# Patient Record
Sex: MALE | Race: OTHER
[De-identification: names, ages, dates, MRNs, and addresses within clinical notes are randomized per-mention and may not be internally consistent; named-entity substitution may affect disease eponyms.]

---

## 2017-09-11 ENCOUNTER — HOSPITAL ENCOUNTER (EMERGENCY)
Dept: HOSPITAL 72 - EMR | Age: 42
Discharge: HOME | End: 2017-09-11
Payer: COMMERCIAL

## 2017-09-11 VITALS — SYSTOLIC BLOOD PRESSURE: 148 MMHG | DIASTOLIC BLOOD PRESSURE: 74 MMHG

## 2017-09-11 VITALS — BODY MASS INDEX: 31.84 KG/M2 | HEIGHT: 69 IN | WEIGHT: 215 LBS

## 2017-09-11 DIAGNOSIS — M54.9: Primary | ICD-10-CM

## 2017-09-11 PROCEDURE — 99284 EMERGENCY DEPT VISIT MOD MDM: CPT

## 2017-09-11 NOTE — EMERGENCY ROOM REPORT
History of Present Illness


General


Chief Complaint:  Back Pain-No Injury


Source:  Patient





Present Illness


HPI


41 yo M with pmhx of hypertension p/w back pain for 14 days. Patient states 

pain started about 3 years ago, and he gets this similar pain about once or 

twice a year.. Pain is localized to R lower back, sharp in nature, radiating 

down leg. Movement worsens pain especially getting up after sitting position, 

but patient states that that he is still been able to ambulate. There are no 

alleviating factors.  Patient has not taken any pain medications.


Denies trauma. Denies lower extremity weakness/numbness, no bowel/bladder 

retention or incontinence, saddle anesthesia. Denies fever, chills, abdominal 

pain, n/v, dysuria/hematuria. No history of IVDA


Allergies:  


Coded Allergies:  


     No Known Allergies (Unverified , 9/11/17)





Patient History


Past Medical History:  see triage record


Past Surgical History:  none


Pertinent Family History:  none


Reviewed Nursing Documentation:  PMH: Agreed, PSxH: Agreed





Nursing Documentation-PMH


Past Medical History:  No Stated History





Review of Systems


All Other Systems:  negative except mentioned in HPI





Physical Exam





Vital Signs








  Date Time  Temp Pulse Resp B/P (MAP) Pulse Ox O2 Delivery O2 Flow Rate FiO2


 


9/11/17 09:25 98.1 67 16 176/102 97 Room Air  








Sp02 EP Interpretation:  reviewed, normal


General Appearance:  normal inspection, well appearing, no apparent distress, 

alert, GCS 15, non-toxic


Head:  normocephalic, atraumatic


Eyes:  bilateral eye normal inspection, bilateral eye PERRL, bilateral eye EOMI


ENT:  normal ENT inspection, normal pharynx, normal voice, moist mucus membranes


Neck:  normal inspection, full range of motion, supple


Respiratory:  normal inspection, lungs clear, normal breath sounds, no 

respiratory distress, no retraction, no wheezing, speaking full sentences, 

chest symmetrical


Cardiovascular #1:  normal inspection, regular rate, rhythm, no edema, normal 

capillary refill


Cardiovascular #2:  2+ radial (R), 2+ radial (L)


Gastrointestinal:  normal inspection, non tender, soft, non-distended, no 

guarding


Genitourinary:  no CVA tenderness


Musculoskeletal:  normal range of motion, other - Right-sided lower lumbar 

paraspinal tenderness, no midline tenderness


Neurologic:  normal inspection, alert, oriented x3, responsive, motor strength/

tone normal, sensory intact, normal gait, speech normal, other - Motor strength 

is 5 out of 5 all 4 extremities


Psychiatric:  normal inspection, judgement/insight normal, memory normal


Skin:  normal inspection, normal color, no rash, warm/dry, well hydrated, 

normal turgor





Medical Decision Making


Diagnostic Impression:  


 Primary Impression:  


 Back pain


ER Course


41 yo male with pmhx of htn p/w back pain


DDX: likely musculoskeletal back pain vs. muscular strain vs. sciatica


Lumbar fracture is unlikely given patients age, no midline tenderness, no 

history of trauma, and that patient is ambulatory. Therefore, at this time no 

imaging is indicated 


Serious diagnoses such as cord compression, epidural abscess is unlikely in 

this patient given the clinical scenario and abscess of neurological symptoms 

or findings. Patient appears nontoxic. 


Plan: motrin, robaxin (lower dose)





ER course:


Patient has remained nontoxic appearing and ambulatory in the ED. 


Pain improved w/ medications





Disposition:


Patient will be discharged to home with prescription of motrin and robaxin.


Patient is to follow up with their PMD within 5 days. Patient agrees with plan. 





Please note that this Emergency Department Report was dictated using [employer] technology software, occasionally this can lead to 

erroneous entry secondary to interpretation by the dictation equipment.





Last Vital Signs








  Date Time  Temp Pulse Resp B/P (MAP) Pulse Ox O2 Delivery O2 Flow Rate FiO2


 


9/11/17 09:25 98.1 67 16 176/102 97 Room Air  








Disposition:  HOME, SELF-CARE


Condition:  Improved


Scripts


Methocarbamol* (ROBAXIN*) 500 Mg Tablet


500 MG PO QID, #28 TAB 0 Refills


   Prov: Steve Santiago M.D.         9/11/17 


Ibuprofen* (MOTRIN*) 600 Mg Tablet


600 MG ORAL Q8H Y for For Pain, #30 TAB 0 Refills


   Prov: Steve Santiago M.D.         9/11/17


Patient Instructions:  Back Pain, Adult











Steve Santiago M.D. Sep 11, 2017 09:50

## 2019-11-30 ENCOUNTER — HOSPITAL ENCOUNTER (EMERGENCY)
Dept: HOSPITAL 72 - EMR | Age: 44
Discharge: HOME | End: 2019-11-30
Payer: COMMERCIAL

## 2019-11-30 VITALS — SYSTOLIC BLOOD PRESSURE: 140 MMHG | DIASTOLIC BLOOD PRESSURE: 78 MMHG

## 2019-11-30 VITALS — BODY MASS INDEX: 37.08 KG/M2 | WEIGHT: 259 LBS | HEIGHT: 70 IN

## 2019-11-30 VITALS — SYSTOLIC BLOOD PRESSURE: 147 MMHG | DIASTOLIC BLOOD PRESSURE: 85 MMHG

## 2019-11-30 DIAGNOSIS — I10: ICD-10-CM

## 2019-11-30 DIAGNOSIS — S61.412A: Primary | ICD-10-CM
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
## 2019-11-30 DIAGNOSIS — Z23: ICD-10-CM

## 2019-11-30 PROCEDURE — 90715 TDAP VACCINE 7 YRS/> IM: CPT

## 2019-11-30 PROCEDURE — 99283 EMERGENCY DEPT VISIT LOW MDM: CPT

## 2019-11-30 PROCEDURE — 90471 IMMUNIZATION ADMIN: CPT

## 2019-11-30 PROCEDURE — 73130 X-RAY EXAM OF HAND: CPT

## 2019-11-30 PROCEDURE — 12001 RPR S/N/AX/GEN/TRNK 2.5CM/<: CPT

## 2019-11-30 NOTE — EMERGENCY ROOM REPORT
History of Present Illness


General


Chief Complaint:  Laceration


Source:  Patient





Present Illness


HPI


44-year-old male with no significant past medical history here complaining of a 

laceration in the left palm that occurred after cutting himself with a knife 

while cooking today at home.  A deep cut is noted into the muscle the left palm 

at the connection point to the wrist.  Patient has full range of motion, no 

motor or sensory deficits noted.  Rating the pain 10 out of 10 without 

radiation has not taken medication for pain.  Patient is not up-to-date with 

her tetanus shot.  Denies all other injuries, chest pain, shortness of breath, 

palpitation, and other associated symptoms.  Patient has full range of motion 

of the affected site.


Allergies:  


Coded Allergies:  


     No Known Allergies (Unverified , 9/11/17)





Patient History


Past Medical History:  see triage record


Past Surgical History:  unable to obtain


Pertinent Family History:  none


Immunizations:  other - Tdap given today


Reviewed Nursing Documentation:  PMH: Agreed; PSxH: Agreed





Nursing Documentation-PMH


Past Medical History:  No History, Except For


Hx Hypertension:  Yes





Review of Systems


All Other Systems:  negative except mentioned in HPI





Physical Exam





Vital Signs








  Date Time  Temp Pulse Resp B/P (MAP) Pulse Ox O2 Delivery O2 Flow Rate FiO2


 


11/30/19 11:31 98.1 71 18 147/85 (105) 94   


 


11/30/19 11:41      Room Air  








Sp02 EP Interpretation:  reviewed, normal


General Appearance:  no apparent distress, alert, GCS 15, non-toxic


Head:  normocephalic, atraumatic


Eyes:  bilateral eye normal inspection, bilateral eye PERRL


ENT:  hearing grossly normal, normal pharynx, no angioedema, normal voice


Neck:  full range of motion, supple/symm/no masses


Respiratory:  chest non-tender, lungs clear, normal breath sounds, no rhonchi, 

no wheezing, speaking full sentences


Cardiovascular #1:  regular rate, rhythm, no edema, no murmur


Gastrointestinal:  normal bowel sounds, non tender, soft, non-distended, no 

guarding, no rebound


Genitourinary:  no CVA tenderness


Musculoskeletal:  back normal, decreased range of motion, normal range of motion


Neurologic:  alert, motor strength/tone normal, oriented x3, sensory intact, 

responsive, speech normal


Psychiatric:  judgement/insight normal


Skin:  laceration - deep lac left palm


Lymphatic:  normal inspection, no adenopathy





Procedures


Laceration/Wound Repair


Laceration/Wound Repair :  


   Consent:  Verbal


   Wound Location:  upper extremity - left palm


   Wound's Depth, Shape:  into muscle


   Wound Length (cm):  2


   Wound Explored:  contaminated


   Irrigated w/ Saline (ccs):  10


   Betadine Prep?:  Yes


   Anesthesia:  Lidocaine w/ Epi


   Volume Anesthetic (ccs):  10


   Wound Repaired With:  sutures


   Suture Size/Type:  4:0, proline


   Number of Sutures:  10


   Layer Closure?:  Yes


   Sterile Dressing Applied?:  Yes


   Splint Applied?:  No


   Sling Applied?:  No


   Patient Tolerated:  Well


   Complications:  None





Medical Decision Making


PA Attestation


All diagnoses and treatment plans were reviewed and discussed with my 

supervising physician Dr. Polo


Diagnostic Impression:  


 Primary Impression:  


 Hand laceration


ER Course


44-year-old male with no significant past medical history here complaining of a 

laceration in the left palm that occurred after cutting himself with a knife 

while cooking today at home.  A deep cut is noted into the muscle the left palm 

at the connection point to the wrist.  Patient has full range of motion, no 

motor or sensory deficits noted.  Rating the pain 10 out of 10 without 

radiation has not taken medication for pain.  Patient is not up-to-date with 

her tetanus shot.  Denies all other injuries, chest pain, shortness of breath, 

palpitation, and other associated symptoms.  Patient has full range of motion 

of the affected site.





Ddx considered but are not limited to : Superficial laceration, deep laceration

, tendon involvement with laceration, laceration with foreign body





Vital signs: are WNL, pt. is afebrile





H&PE are most consistent with: Deep laceration, left hand





ORDERS: Augmentin, tylenol


ED INTERVENTIONS: Wound closure





DISCHARGE: At this time pt. is stable for d/c to home. Will provide printed 

patient care instructions, and any necessary prescriptions. Care plan and 

follow up instructions have been discussed with the patient prior to discharge.


Sutures removed in 7 to 10 days, patient requested Tylenol instead of ibuprofen 

as reports that Tylenol works better for him and ibuprofen bothers his stomach.

  If worsening symptoms return to emergency room.  Proper wound care was 

explained to patient


Other X-Ray Diagnostic Results


Other X-Ray Diagnostic Results :  


   X-Ray ordered:  hand xray


   # of Views/Limited Vs Complete:  3 View


   Indication:  Pain


   EP Interpretation:  Yes


   PA Xray:  Interpretation reviewed, by supervising MD, and agrees with 

findings.


   Interpretation:  no dislocation, no soft tissue swelling, no fractures, 

other - no FB


   Impression:  No acute disease


   Electronically Signed by:  Natasha Spence PA-C





Last Vital Signs








  Date Time  Temp Pulse Resp B/P (MAP) Pulse Ox O2 Delivery O2 Flow Rate FiO2


 


11/30/19 11:41 98.1 71 18 147/85 94 Room Air  








Disposition:  HOME, SELF-CARE


Condition:  Stable


Scripts


Acetaminophen* (TYLENOL EXTRA STRENGTH*) 500 Mg Tablet


500 MG ORAL Q6H PRN for Mild Pain/Temp > 100.5, #30 TAB 0 Refills


   Prov: Natasha Serrano         11/30/19 


Amoxicillin/Potassium Clav 875-125* (AUGMENTIN 875-125 TABLET*) 1 Each Tablet


1 TAB ORAL TWICE A DAY for 10 Days, #20 TAB


   Prov: Natasha Serrano         11/30/19


Referrals:  


[facility],REFERRING (PCP)


Patient Instructions:  Laceration Care, Adult





Additional Instructions:  


Take medication as directed, if worsening symptoms return to emergency room.  

Follow-up with primary care provider, sutures to be removed in 7 to 10 days











Natasha Serrano Nov 30, 2019 13:22

## 2019-11-30 NOTE — DIAGNOSTIC IMAGING REPORT
EXAM:

  XR Left Hand Complete, 3 or More Views

 

CLINICAL HISTORY:

  TRAUMA

 

TECHNIQUE:

  Frontal, lateral and oblique views of the left hand.

 

COMPARISON:

  None

 

FINDINGS:

  Bones/joints: No displaced fracture or dislocation identified.  Joint 

space is maintained.  No bony lesion.

 

  Soft tissues: Mild soft tissue prominence.

 

IMPRESSION:   

  No displaced fracture or dislocation identified.
Yes